# Patient Record
Sex: MALE | Race: WHITE | Employment: FULL TIME | URBAN - METROPOLITAN AREA
[De-identification: names, ages, dates, MRNs, and addresses within clinical notes are randomized per-mention and may not be internally consistent; named-entity substitution may affect disease eponyms.]

---

## 2017-10-25 ENCOUNTER — APPOINTMENT (OUTPATIENT)
Dept: GENERAL RADIOLOGY | Age: 57
End: 2017-10-25
Attending: NURSE PRACTITIONER
Payer: COMMERCIAL

## 2017-10-25 ENCOUNTER — HOSPITAL ENCOUNTER (EMERGENCY)
Age: 57
Discharge: HOME | End: 2017-10-26
Attending: EMERGENCY MEDICINE
Payer: COMMERCIAL

## 2017-10-25 VITALS
DIASTOLIC BLOOD PRESSURE: 84 MMHG | OXYGEN SATURATION: 98 % | WEIGHT: 173 LBS | HEART RATE: 85 BPM | SYSTOLIC BLOOD PRESSURE: 137 MMHG | HEIGHT: 70 IN | BODY MASS INDEX: 24.77 KG/M2 | RESPIRATION RATE: 18 BRPM | TEMPERATURE: 98.5 F

## 2017-10-25 DIAGNOSIS — S62.639A CLOSED AVULSION FRACTURE OF DISTAL PHALANX OF FINGER, INITIAL ENCOUNTER: ICD-10-CM

## 2017-10-25 DIAGNOSIS — S63.259A FINGER DISLOCATION, INITIAL ENCOUNTER: Primary | ICD-10-CM

## 2017-10-25 PROCEDURE — 75810000301 HC ER LEVEL 1 CLOSED TREATMNT FRACTURE/DISLOCATION

## 2017-10-25 PROCEDURE — 99282 EMERGENCY DEPT VISIT SF MDM: CPT

## 2017-10-25 PROCEDURE — 73130 X-RAY EXAM OF HAND: CPT

## 2017-10-25 RX ORDER — NAPROXEN 500 MG/1
500 TABLET ORAL 2 TIMES DAILY WITH MEALS
Qty: 20 TAB | Refills: 0 | Status: SHIPPED | OUTPATIENT
Start: 2017-10-25 | End: 2017-11-04

## 2017-10-25 NOTE — LETTER
NOTIFICATION RETURN TO WORK / SCHOOL 
 
10/25/2017 11:59 PM 
 
[address] 
[address] [address] To Whom It May Concern: 
 
Hannah Waddell is currently under the care of [facility]. He will return to work/school on: November 2, 2017. Right finger fracture with dislocation. If there are questions or concerns please have the patient contact our office. Sincerely, Steven Santos NP 
10/25/17 
11:59 PM

## 2017-10-25 NOTE — Clinical Note
Thank you for allowing us to care for you today. Please follow-up with your Primary Care provider in the next 2-3 days if your symptoms do not improve. Plan for home:  
 
Splint on right 5th finger for 7 days. OK to remove splint to wash hands and m ove finger. RICE therapy: 
\"R\" is for rest. Please rest the injured area \"I\" is for ice. Please ice the area and 20 minute increments multiple times daily. Make sure you have at least 20 minutes between each ice application. Do not place ice directly  on the skin as it can cause frostbite. \"C\" is for compression. You may use a light Ace wrap to compress the area to help the swelling. Make sure you have no numbness and tingling past the Ace wrap. \"E\" is for elevate. Keep the swollen area elevated as  much as possible. Elevate above the heart whenever possible If you still have pain or difficulty moving finger in 1 week, follow-up with Orthopedic hand specialist. Dr. Sabrina Fine is the hand specialist with [address] [place] [address].

## 2017-10-26 PROCEDURE — 77030008326 HC SPLNT FNGR PLSTL DERY -A

## 2017-10-26 NOTE — DISCHARGE INSTRUCTIONS
Finger Fracture: Care Instructions  Your Care Instructions    Breaks in the bones of the finger usually heal well in about 3 to 4 weeks. The pain and swelling from a broken finger can last for weeks. But it should steadily improve, starting a few days after you break it. It is very important that you wear and take care of the cast or splint exactly as your doctor tells you to so that your finger heals properly and does not end up crooked. Wearing a splint may interfere with your normal activities. Ask for help with daily tasks if you need it. You heal best when you take good care of yourself. Eat a variety of healthy foods, and don't smoke. Follow-up care is a key part of your treatment and safety. Be sure to make and go to all appointments, and call your doctor if you are having problems. It's also a good idea to know your test results and keep a list of the medicines you take. How can you care for yourself at home? · If your doctor put a splint on your finger, wear the splint exactly as directed. Do not remove it until your doctor says that you can. · Keep your hand raised above the level of your heart as much as you can. This will help reduce swelling. · Put ice or a cold pack on your finger for 10 to 20 minutes at a time. Try to do this every 1 to 2 hours for the next 3 days (when you are awake) or until the swelling goes down. Put a thin cloth between the ice and your skin. Keep the splint dry. · Be safe with medicines. Take pain medicines exactly as directed. ¨ If the doctor gave you a prescription medicine for pain, take it as prescribed. ¨ If you are not taking a prescription pain medicine, ask your doctor if you can take an over-the-counter medicine. When should you call for help? Call 911 anytime you think you may need emergency care. For example, call if:  ? · Your finger is cool or pale or changes color.    ?Call your doctor now or seek immediate medical care if:  ? · Your pain gets much worse. ? · You have tingling, weakness, or numbness in your finger. ? · You have signs of infection, such as:  ¨ Increased pain, swelling, warmth, or redness. ¨ Red streaks leading from the area. ¨ Pus draining from the area. ¨ Swollen lymph nodes in your neck, armpits, or groin. ¨ A fever. ? Watch closely for changes in your health, and be sure to contact your doctor if:  ? · Your finger is not steadily improving. Where can you learn more? Go to http://rhina-hay.info/. Enter P993 in the search box to learn more about \"Finger Fracture: Care Instructions. \"  Current as of: March 21, 2017  Content Version: 11.4  © 3116-7768 [employer]. Care instructions adapted under license by [employer] (which disclaims liability or warranty for this information). If you have questions about a medical condition or this instruction, always ask your healthcare professional. [place] [address] any warranty or liability for your use of this information. Dislocated Finger: Care Instructions  Your Care Instructions  When the bones of a finger are forced out of their normal position, it is called a dislocated finger. This can happen when a finger jams or bends backward. It is common during sports. A doctor can put your finger back in its normal position. You probably knew that something was wrong with your finger right away. This is because a dislocated finger usually hurts a lot. And it doesn't look straight. Your doctor may have put a splint on the finger. This will keep it in position while it heals. Your doctor may also recommend exercises to strengthen your finger. Rest and home treatment can also help you get better. If you damaged bones or muscles, you may need more treatment. Follow-up care is a key part of your treatment and safety. Be sure to make and go to all appointments, and call your doctor if you are having problems.  It's also a good idea to know your test results and keep a list of the medicines you take. How can you care for yourself at home? · If your doctor put a splint on your finger, wear it as directed. Do not remove it until your doctor says you can. · Do not do anything that makes the pain worse. · If your finger is swollen, put ice or a cold pack on it for 10 to 20 minutes at a time. Try to do this every 1 to 2 hours for the next 3 days (when you are awake) or until the swelling goes down. Put a thin cloth between the ice and your skin. · Prop up your hand on a pillow when you ice it or anytime you sit or lie down during the next 3 days. Try to keep it above the level of your heart. This will help reduce swelling. · Take your medicines exactly as prescribed. Call your doctor if you think you are having a problem with your medicine. · Ask your doctor if you can take an over-the-counter pain medicine, such as acetaminophen (Tylenol), ibuprofen (Advil, Motrin), or naproxen (Aleve). Be safe with medicines. Read and follow all instructions on the label. · If your doctor recommends exercises, do them as directed. When should you call for help? Call your doctor now or seek immediate medical care if:  ? · You have new or worse pain. ? · Your finger is cool or pale or changes color. ? · You have tingling, weakness, or numbness in the finger. ? Watch closely for changes in your health, and be sure to contact your doctor if:  ? · You do not get better as expected. Where can you learn more? Go to http://rhina-hay.info/. Enter R117 in the search box to learn more about \"Dislocated Finger: Care Instructions. \"  Current as of: March 21, 2017  Content Version: 11.4  © 8838-3504 [employer]. Care instructions adapted under license by [employer] (which disclaims liability or warranty for this information).  If you have questions about a medical condition or this instruction, always ask your healthcare professional. [address] any warranty or liability for your use of this information.

## 2017-10-26 NOTE — ED PROVIDER NOTES
HPI Comments:   Patient is a 53-year-old male that comes to the ED today via private vehicle his car by feeling or with her right finger dislocation. He states he fell at home walking up the steps when he fell onto his right hand and jammed the finger. Patient states this occurred at approximately 8 PM this evening. [facility] They obtained an x-ray and sent him to the ED for a reduction. There are no other complaints at this time. Primary care provider: Castro Patel MD      The history is provided by the patient. No  was used. No past medical history on file. No past surgical history on file. No family history on file. Social History     Social History    Marital status:      Spouse name: N/A    Number of children: N/A    Years of education: N/A     Occupational History    Not on file. Social History Main Topics    Smoking status: Not on file    Smokeless tobacco: Not on file    Alcohol use Not on file    Drug use: Not on file    Sexual activity: Not on file     Other Topics Concern    Not on file     Social History Narrative         ALLERGIES: Review of patient's allergies indicates no known allergies. Review of Systems   Musculoskeletal:        Right 5th finger dislocation   All other systems reviewed and are negative. Vitals:    10/25/17 2142   BP: 137/84   Pulse: 85   Resp: 18   Temp: 98.5 °F (36.9 °C)   SpO2: 98%   Weight: 78.5 kg (173 lb)   Height: 5' 10\" (1.778 m)            Physical Exam   Constitutional: He appears well-developed and well-nourished. HENT:   Head: Normocephalic and atraumatic. Musculoskeletal: He exhibits edema, tenderness and deformity. Right hand: He exhibits decreased range of motion, tenderness, bony tenderness, deformity and swelling. He exhibits normal capillary refill and no laceration. Hands:  Nursing note and vitals reviewed.        MDM  Number of Diagnoses or Management Options  Closed avulsion fracture of distal phalanx of finger, initial encounter:   Finger dislocation, initial encounter:   Diagnosis management comments: Assessment & Plan:     Right hand films    Discussed with MD Lorenzo Gamez NP  10/25/17  10:43 PM      Finger dislocated. No fracture    Dr. Brooklynn Hernandez reduced the finger    Post reduction films    Lorenzo Chavez NP  10/25/17  11:09 PM    Small avulsion at base of 5th middle phalanx    Michael tape & splint    Follow-up with Richard next week as this is his dominant hand and he uses a firearm     12:04 AM  The patient has been reevaluated. The patient is ready for discharge. The patient's signs, symptoms, diagnosis, and discharge instructions have been discussed and the patient/ family has conveyed their understanding. The patient is to follow up as recommended or return to the ED should their symptoms worsen. Plan has been discussed and the patient is in agreement. LABORATORY TESTS:  No results found for this or any previous visit (from the past 12 hour(s)). IMAGING RESULTS:  XR HAND RT MIN 3 V   Final Result     XR HAND RT MIN 3 V   Final Result     Xr Hand Rt Min 3 V    Result Date: 10/25/2017  INTERPRETATION PROVIDED FOR COMPLIANCE ONLY AT NO CHARGE  EXAM:  XR HAND RT MIN 3 V INDICATION:  post reduction films. COMPARISON: October 25, 2017 10:37 PM FINDINGS: Three views of the right hand obtained at 11:17 PM demonstrate interval reduction of the previously seen fifth PIP dislocation. A small avulsion fractures seen at the base of the middle phalanx. The soft tissues are within normal limits. IMPRESSION:  Successful reduction of fifth PIP dislocation. Small avulsion fracture involving the base of the fifth middle phalanx. Xr Hand Rt Min 3 V    Result Date: 10/25/2017  EXAM:  XR HAND RT MIN 3 V INDICATION:  right 5th finger dislocation. R/O fracture. COMPARISON: None.  FINDINGS: Three views of the right hand demonstrate dislocation of the 5th middle phalanx at the proximal phalanx. There is a tiny sliver of ossific density lateral to the distal proximal phalanx. The soft tissues are within normal limits. IMPRESSION:  1. Dislocation in the 5th proximal interphalangeal joint with likely tiny avulsion fracture. MEDICATIONS GIVEN:  Medications - No data to display    IMPRESSION:  Finger dislocation, initial encounter  (primary encounter diagnosis)  Closed avulsion fracture of distal phalanx of finger, initial encounter    PLAN:  1. Current Discharge Medication List    START taking these medications    naproxen (NAPROSYN) 500 mg tablet  Take 1 Tab by mouth two (2) times daily (with meals) for 10 days. Indications: Pain  Qty: 20 Tab Refills: 0        2. Follow-up Information     Follow up With Details Comments [address], [place]  As needed for right finger pain or decreased mobility   [address] [facility]  [address] [facility]  [phone]      Roberta Ramírez MD  As needed, If symptoms worsen [address]  [address] [facility] [phone]      [facility]  As needed, If symptoms worsen [address] [facility]  [phone]        Return to ED for new or worsening symptoms       Wilton Schaeffer NP         Amount and/or Complexity of Data Reviewed  Tests in the radiology section of CPT®: ordered and reviewed      ED Course       Reduction of Joint  Date/Time: 11/1/2017 3:39 PM  Performed by: Iram Dowling  Authorized by: Iram Dowling     Consent:     Consent obtained:  Verbal    Consent given by:  Patient    Risks discussed:  Pain  Injury:     Injury location:  Finger    Finger injury location:  R little finger  Pre-procedure assessment:     Neurological function: normal      Distal perfusion: normal      Range of motion: reduced    Anesthesia (see MAR for exact dosages):      Anesthesia method:  None  Procedure details: Manipulation performed: yes      X-ray confirmed reduction: yes      Immobilization:  Splint and tape    Supplies used:  Aluminum splint  Post-procedure assessment:     Distal perfusion: normal      Range of motion: normal      Patient tolerance of procedure:   Tolerated well, no immediate complications

## 2017-10-26 NOTE — ED TRIAGE NOTES
Patient fell going up the steps and jammed his right pinky finger on the rail. [facility] and [facility], was referred here because it needs to be reduced.